# Patient Record
Sex: FEMALE | Race: WHITE | NOT HISPANIC OR LATINO | Employment: FULL TIME | URBAN - METROPOLITAN AREA
[De-identification: names, ages, dates, MRNs, and addresses within clinical notes are randomized per-mention and may not be internally consistent; named-entity substitution may affect disease eponyms.]

---

## 2017-05-12 ENCOUNTER — OFFICE VISIT (OUTPATIENT)
Dept: URGENT CARE | Facility: CLINIC | Age: 47
End: 2017-05-12
Payer: COMMERCIAL

## 2017-05-12 VITALS
BODY MASS INDEX: 22.63 KG/M2 | TEMPERATURE: 98.8 F | DIASTOLIC BLOOD PRESSURE: 62 MMHG | HEART RATE: 75 BPM | OXYGEN SATURATION: 96 % | SYSTOLIC BLOOD PRESSURE: 122 MMHG | WEIGHT: 123 LBS | HEIGHT: 62 IN | RESPIRATION RATE: 12 BRPM

## 2017-05-12 DIAGNOSIS — R42 DIZZINESS: ICD-10-CM

## 2017-05-12 DIAGNOSIS — R55 SYNCOPE, UNSPECIFIED SYNCOPE TYPE: ICD-10-CM

## 2017-05-12 DIAGNOSIS — S09.93XA FACIAL TRAUMA, INITIAL ENCOUNTER: ICD-10-CM

## 2017-05-12 PROCEDURE — 99214 OFFICE O/P EST MOD 30 MIN: CPT | Performed by: NURSE PRACTITIONER

## 2017-05-12 PROCEDURE — 93000 ELECTROCARDIOGRAM COMPLETE: CPT | Performed by: NURSE PRACTITIONER

## 2017-05-12 ASSESSMENT — ENCOUNTER SYMPTOMS
RESPIRATORY NEGATIVE: 1
TINGLING: 0
SEIZURES: 0
SORE THROAT: 1
NECK PAIN: 1
HEADACHES: 1
DIZZINESS: 1
BLURRED VISION: 1
CONSTITUTIONAL NEGATIVE: 1
BACK PAIN: 0
PALPITATIONS: 0
FOCAL WEAKNESS: 0
LOSS OF CONSCIOUSNESS: 1
GASTROINTESTINAL NEGATIVE: 1
SENSORY CHANGE: 0
TREMORS: 0
FALLS: 1

## 2017-05-12 ASSESSMENT — PAIN SCALES - GENERAL: PAINLEVEL: 4=SLIGHT-MODERATE PAIN

## 2017-05-12 NOTE — MR AVS SNAPSHOT
"        Rosita Emmanuel   2017 4:30 PM   Office Visit   MRN: [id number]    Department:  [facility]   Dept Phone:  [phone]    Description:  Female : 1970   Provider:  SANJIV Browning           Reason for Visit     Otalgia last saturday lost concinoness dizzy spells since saturday      Allergies as of 2017     No Known Allergies      You were diagnosed with     Syncope, unspecified syncope type   [0877428]       Facial trauma, initial encounter   [[id number]]       Dizziness   [[id number]]         Vital Signs     Blood Pressure Pulse Temperature Respirations Height Weight    122/62 mmHg 75 37.1 °C (98.8 °F) 12 1.575 m (5' 2.01\") 55.792 kg (123 lb)    Body Mass Index Oxygen Saturation Breastfeeding? Smoking Status          22.49 kg/m2 96% No Former Smoker        Basic Information     Date Of Birth Sex Race Ethnicity Preferred Language    1970 Female White Unknown English      Problem List              ICD-10-CM Priority Class Noted - Resolved    Routine health maintenance Z00.00   2012 - Present    Mild intermittent asthma J45.20   2012 - Present    Allergic rhinitis    2012 - Present    Family history of diabetes mellitus in father Z83.3   2015 - Present    Family history of gestational diabetes mellitus Z83.3   2015 - Present    H/O bilateral breast implants Z98.82   2015 - Present      Health Maintenance        Date Due Completion Dates    IMM PNEUMOCOCCAL 19-64 (ADULT) MEDIUM RISK SERIES (1 of 1 - PPSV23) 1991 ---    MAMMOGRAM 2016, 2014    PAP SMEAR 3/12/2017 3/12/2014 (Done), 2012 (Prv Comp)    Override on 3/12/2014: Done    Override on 2012: Previously completed    IMM DTaP/Tdap/Td Vaccine (2 - Td) 2022            Current Immunizations     Hepatitis B Vaccine Recombivax (Adol/Adult) 2013, 10/30/2013, 2013    Influenza Vaccine Quad Inj (Preserved) 2016    Tdap Vaccine 2012   " Tuberculin Skin Test 8/30/2013, 8/21/2013      Below and/or attached are the medications your provider expects you to take. Review all of your home medications and newly ordered medications with your provider and/or pharmacist. Follow medication instructions as directed by your provider and/or pharmacist. Please keep your medication list with you and share with your provider. Update the information when medications are discontinued, doses are changed, or new medications (including over-the-counter products) are added; and carry medication information at all times in the event of emergency situations     Allergies:  No Known Allergies          Medications  Valid as of: May 12, 2017 -  6:01 PM    Generic Name Brand Name Tablet Size Instructions for use    Albuterol Sulfate (Aero Soln) albuterol 108 (90 BASE) MCG/ACT Inhale 2 Puffs by mouth every 6 hours as needed for Shortness of Breath.        .                 Medicines prescribed today were sent to:     [id number] [place] [id number] [address] [address]    Phone: [phone] Fax: [phone]    Open 24 Hours?: No      Medication refill instructions:       If your prescription bottle indicates you have medication refills left, it is not necessary to call your provider’s office. Please contact your pharmacy and they will refill your medication.    If your prescription bottle indicates you do not have any refills left, you may request refills at any time through one of the following ways: The online [employer] system (except Urgent Care), by calling your provider’s office, or by asking your pharmacy to contact your provider’s office with a refill request. Medication refills are processed only during regular business hours and may not be available until the next business day. Your provider may request additional information or to have a follow-up visit with you prior to refilling your medication.   *Please Note: Medication  refills are assigned a new Rx number when refilled electronically. Your pharmacy may indicate that no refills were authorized even though a new prescription for the same medication is available at the pharmacy. Please request the medicine by name with the pharmacy before contacting your provider for a refill.           [employer] Access Code: Activation code not generated  Current [employer] Status: Active
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
## 2018-03-21 ENCOUNTER — OFFICE VISIT (OUTPATIENT)
Dept: URGENT CARE | Facility: PHYSICIAN GROUP | Age: 48
End: 2018-03-21
Payer: COMMERCIAL

## 2018-03-21 VITALS
SYSTOLIC BLOOD PRESSURE: 112 MMHG | DIASTOLIC BLOOD PRESSURE: 82 MMHG | BODY MASS INDEX: 22.26 KG/M2 | HEART RATE: 73 BPM | TEMPERATURE: 98.6 F | RESPIRATION RATE: 12 BRPM | OXYGEN SATURATION: 96 % | WEIGHT: 121 LBS | HEIGHT: 62 IN

## 2018-03-21 DIAGNOSIS — J01.00 ACUTE NON-RECURRENT MAXILLARY SINUSITIS: ICD-10-CM

## 2018-03-21 DIAGNOSIS — H65.192 ACUTE EFFUSION OF LEFT EAR: ICD-10-CM

## 2018-03-21 PROCEDURE — 99214 OFFICE O/P EST MOD 30 MIN: CPT | Performed by: NURSE PRACTITIONER

## 2018-03-21 RX ORDER — FLUTICASONE PROPIONATE 50 MCG
1 SPRAY, SUSPENSION (ML) NASAL DAILY
Qty: 16 G | Refills: 0 | Status: SHIPPED | OUTPATIENT
Start: 2018-03-21 | End: 2018-10-30

## 2018-03-21 RX ORDER — FLUCONAZOLE 150 MG/1
150 TABLET ORAL DAILY
Qty: 2 TAB | Refills: 0 | Status: SHIPPED | OUTPATIENT
Start: 2018-03-21 | End: 2018-07-16

## 2018-03-21 RX ORDER — AMOXICILLIN AND CLAVULANATE POTASSIUM 875; 125 MG/1; MG/1
1 TABLET, FILM COATED ORAL 2 TIMES DAILY
Qty: 20 TAB | Refills: 0 | Status: SHIPPED | OUTPATIENT
Start: 2018-03-21 | End: 2018-03-31

## 2018-03-21 ASSESSMENT — ENCOUNTER SYMPTOMS
SINUS PRESSURE: 1
SHORTNESS OF BREATH: 0
FEVER: 0
EYE PAIN: 0
SORE THROAT: 0
NAUSEA: 0
SINUS PAIN: 1
HEADACHES: 0
CHILLS: 0
COUGH: 0
VOMITING: 0
MYALGIAS: 0
DIZZINESS: 0

## 2018-03-22 NOTE — PROGRESS NOTES
Subjective:     Rosita Emmanuel is a 47 y.o. female who presents for Otalgia (Lt ear x 4 days ) and Congestion (x 4 days )       Otalgia    There is pain in the left ear. This is a new problem. The current episode started in the past 7 days. The problem occurs constantly. The problem has been unchanged. There has been no fever. The pain is at a severity of 8/10. The pain is moderate. Pertinent negatives include no coughing, ear discharge, headaches, rash, sore throat or vomiting. Associated symptoms comments: Sinus pain  . She has tried nothing for the symptoms. The treatment provided no relief. Her past medical history is significant for a chronic ear infection.   Sinus Problem   This is a new problem. The current episode started in the past 7 days. The problem is unchanged. There has been no fever. Her pain is at a severity of 6/10. The pain is moderate. Associated symptoms include congestion, ear pain and sinus pressure. Pertinent negatives include no chills, coughing, headaches, shortness of breath or sore throat. Past treatments include acetaminophen. The treatment provided no relief.   Patient has a history of yeast infections post anabiotic use. Patient requesting prescription for Diflucan.  Past Medical History:   Diagnosis Date   • Allergic rhinitis 4/11/2012   • Gestational diabetes    • Routine health maintenance 4/11/2012     Past Surgical History:   Procedure Laterality Date   • PB ENLARGE BREAST WITH IMPLANT    2004   • HYSTERECTOMY LAPAROSCOPY     • OTHER      Lasik eye   • PRIMARY C SECTION       Social History     Social History   • Marital status: Single     Spouse name: N/A   • Number of children: N/A   • Years of education: N/A     Occupational History   • Not on file.     Social History Main Topics   • Smoking status: Former Smoker     Packs/day: 0.25     Years: 5.00     Types: Cigarettes     Quit date: 8/26/1992   • Smokeless tobacco: Never Used   • Alcohol use 0.5 oz/week     1 Glasses of  "wine per week      Comment: glass of wine a day   • Drug use: No   • Sexual activity: Yes     Partners: Male      Comment:       Other Topics Concern   • Not on file     Social History Narrative   • No narrative on file      Family History   Problem Relation Age of Onset   • Diabetes Father    • Diabetes Sister    • Cancer Maternal Grandmother      great grand mother in her 90 breast cancer   • GI Neg Hx    • Heart Disease Neg Hx    • Heart Failure Neg Hx    • Hyperlipidemia Neg Hx     Review of Systems   Constitutional: Negative for chills and fever.   HENT: Positive for congestion, ear pain, sinus pain and sinus pressure. Negative for ear discharge and sore throat.    Eyes: Negative for pain.   Respiratory: Negative for cough and shortness of breath.    Cardiovascular: Negative for chest pain.   Gastrointestinal: Negative for nausea and vomiting.   Genitourinary: Negative for hematuria.   Musculoskeletal: Negative for myalgias.   Skin: Negative for rash.   Neurological: Negative for dizziness and headaches.   No Known Allergies   Objective:   /82   Pulse 73   Temp 37 °C (98.6 °F)   Resp 12   Ht 1.575 m (5' 2.01\")   Wt 54.9 kg (121 lb)   SpO2 96%   BMI 22.12 kg/m²   Physical Exam   Constitutional: She is oriented to person, place, and time. She appears well-developed and well-nourished. No distress.   HENT:   Head: Normocephalic and atraumatic.   Right Ear: Tympanic membrane normal. Tympanic membrane is not perforated and not bulging. No middle ear effusion.   Left Ear: Tympanic membrane is bulging. Tympanic membrane is not perforated. A middle ear effusion is present.   Nose: Right sinus exhibits maxillary sinus tenderness and frontal sinus tenderness. Left sinus exhibits maxillary sinus tenderness and frontal sinus tenderness.   Mouth/Throat: Uvula is midline. No posterior oropharyngeal erythema. No tonsillar exudate.   Eyes: Conjunctivae and EOM are normal. Pupils are equal, round, and " reactive to light.   Cardiovascular: Normal rate and regular rhythm.    No murmur heard.  Pulmonary/Chest: Effort normal and breath sounds normal. No respiratory distress.   Abdominal: Soft. She exhibits no distension. There is no tenderness.   Neurological: She is alert and oriented to person, place, and time. She has normal reflexes. No sensory deficit.   Skin: Skin is warm and dry.   Psychiatric: She has a normal mood and affect.   Vitals reviewed.        Assessment/Plan:   Assessment    1. Acute non-recurrent maxillary sinusitis  2. Acute effusion of left ear  - amoxicillin-clavulanate (AUGMENTIN) 875-125 MG Tab; Take 1 Tab by mouth 2 times a day for 10 days.  Dispense: 20 Tab; Refill: 0  - fluticasone (FLONASE) 50 MCG/ACT nasal spray; Spray 1 Spray in nose every day.  Dispense: 16 g; Refill: 0  - fluconazole (DIFLUCAN) 150 MG tablet; Take 1 Tab by mouth every day.  Dispense: 2 Tab; Refill: 0    Advised to continue supportive care with Tylenol and/or ibuprofen for fevers and discomfort. Increased fluids and electrolytes.  Advised Flonase, OTC allergy medication.      Differential diagnosis, natural history, supportive care, and indications for immediate follow-up discussed.

## 2018-07-16 ENCOUNTER — OFFICE VISIT (OUTPATIENT)
Dept: MEDICAL GROUP | Facility: PHYSICIAN GROUP | Age: 48
End: 2018-07-16
Payer: COMMERCIAL

## 2018-07-16 VITALS
HEIGHT: 62 IN | DIASTOLIC BLOOD PRESSURE: 74 MMHG | HEART RATE: 78 BPM | RESPIRATION RATE: 16 BRPM | TEMPERATURE: 99.3 F | OXYGEN SATURATION: 98 % | SYSTOLIC BLOOD PRESSURE: 120 MMHG | WEIGHT: 119 LBS | BODY MASS INDEX: 21.9 KG/M2

## 2018-07-16 DIAGNOSIS — L98.9 SKIN LESION: ICD-10-CM

## 2018-07-16 DIAGNOSIS — J45.20 MILD INTERMITTENT ASTHMA WITHOUT COMPLICATION: ICD-10-CM

## 2018-07-16 DIAGNOSIS — F41.9 ANXIETY: ICD-10-CM

## 2018-07-16 PROCEDURE — 99214 OFFICE O/P EST MOD 30 MIN: CPT | Performed by: INTERNAL MEDICINE

## 2018-07-16 RX ORDER — CITALOPRAM HYDROBROMIDE 10 MG/1
10 TABLET ORAL DAILY
Qty: 30 TAB | Refills: 1 | Status: SHIPPED | OUTPATIENT
Start: 2018-07-16 | End: 2018-09-09 | Stop reason: SDUPTHER

## 2018-07-16 RX ORDER — ALPRAZOLAM 0.5 MG/1
0.5 TABLET ORAL NIGHTLY PRN
COMMUNITY
End: 2018-07-16

## 2018-07-16 RX ORDER — CLONAZEPAM 0.5 MG/1
0.25 TABLET ORAL
Qty: 30 TAB | Refills: 2 | Status: SHIPPED | OUTPATIENT
Start: 2018-07-16 | End: 2018-08-15

## 2018-07-16 ASSESSMENT — PATIENT HEALTH QUESTIONNAIRE - PHQ9: CLINICAL INTERPRETATION OF PHQ2 SCORE: 0

## 2018-07-16 NOTE — ASSESSMENT & PLAN NOTE
Uses flonase as needed. Typically cold or illnesses will set off her asthma. Uses her inhaler very rarely.

## 2018-07-16 NOTE — PROGRESS NOTES
[facility] NEW PATIENT H&P  Chief Complaint   Patient presents with   • Anxiety     History of Present Illness     Mild intermittent asthma  Uses flonase as needed. Typically cold or illnesses will set off her asthma. Uses her inhaler very rarely.     Anxiety  In the last two years she has been going through a nasty divorce and her youngest daughter doesn't talk to her anymore. She has also been going to grad school and has started a new career. She also has issues with anxiety affecting her sleep. It will feel like she has a 50 lb weight on her chest and she has trouble breathing when she's having anxiety. She has been taking alprazolam 0.5 mg quite a bit lately. She doesn't like the side affects of anti-depressants. She is very sensitive to side affects of medications. She has tried Wellbutrin (shaking, difficulty walking, uncontrollable crying for hours), Paxil (weight gain, sexual side affects). Was on Wellbutrin for 3 weeks. Paxil she was on for about 4 months a while ago. She isn't sure if she's tried zoloft before. She has tried therapy before but didn't feel that that therapist was right for her.     Skin lesion  Notes a nodule near her left axilla which she nicks while shaving.     Current Outpatient Prescriptions   Medication Sig Dispense Refill   • citalopram (CELEXA) 10 MG tablet Take 1 Tab by mouth every day. 30 Tab 1   • clonazePAM (KLONOPIN) 0.5 MG Tab Take 0.5 Tabs by mouth 1 time daily as needed for up to 30 days. 30 Tab 2   • fluticasone (FLONASE) 50 MCG/ACT nasal spray Spray 1 Spray in nose every day. 16 g 0     No current facility-administered medications for this visit.      Past Medical History:   Diagnosis Date   • Allergic rhinitis 4/11/2012   • Gestational diabetes    • Mild intermittent asthma 4/11/2012    Secondary to allergies      Past Surgical History:   Procedure Laterality Date   • PB ENLARGE BREAST WITH IMPLANT    2004   • BREAST BIOPSY Right     benign   • HYSTERECTOMY  "LAPAROSCOPY      has ovaries and fallopian tubes. Done for uterine tear    • KNEE ARTHROSCOPY Left     meniscus tear   • MAMMOPLASTY REDUCTION      after initial breast augmentation    • OTHER      Lasik eye   • PRIMARY C SECTION      x2     Social History   Substance Use Topics   • Smoking status: Former Smoker     Packs/day: 0.25     Years: 5.00     Types: Cigarettes     Quit date: 8/26/1992   • Smokeless tobacco: Never Used   • Alcohol use 4.2 oz/week     7 Glasses of wine per week      Comment: glass of wine a day     Social History     Social History Narrative         Family History   Problem Relation Age of Onset   • Diabetes Father    • Diabetes Sister    • Breast Cancer Maternal Grandmother      great grand mother in her 90 breast cancer   • GI Neg Hx    • Heart Disease Neg Hx    • Heart Failure Neg Hx    • Hyperlipidemia Neg Hx      Family Status   Relation Status   • Father Alive   • Mother Alive   • Sister Alive   • Maternal Grandmother    • Neg Hx      Allergies: Patient has no known allergies.    ROS  Constitutional: Negative for fatigue/generalized weakness.   HEENT: Negative for  vision changes, hearing changes    Respiratory: Negative for shortness of breath  Cardiovascular: Negative for chest pain, palpitations  Gastrointestinal: Negative for blood in stool, constipation, diarrhea  Genitourinary: Negative for dysuria, polyuria  Musculoskeletal: Negative for myalgias, back pain, and joint pain.   Skin: Negative for rash  Neurological: Negative for numbness, tingling  Psychiatric/Behavioral: Positive for anxiety as per HPI     Objective   Blood pressure 120/74, pulse 78, temperature 37.4 °C (99.3 °F), resp. rate 16, height 1.575 m (5' 2\"), weight 54 kg (119 lb), SpO2 98 %. Body mass index is 21.77 kg/m².    General: Alert, oriented. In no acute distress   HEET: EOMI, PERRL, conjunctiva non-injected, sclera non-icteric.  Nares patent with no significant congestion or " drainage.  Elvie pinnae, external auditory canals, TM pearly gray with normal light reflex bilaterally.Oral mucous membranes pink and moist with no lesions.  Neck: supple with no cervical, subclavicular lymphadenopathy, JVD, palpable thyroid nodules   Lungs: clear to auscultation bilaterally with good excursion.  CV: regular rate and rhythm.  Abdomen soft, non-distended, non-tender with normal bowel sounds. No hepatosplenomegaly, no masses palpated  Skin: small symmetric flesh colored nodule near left axilla   Psychiatric: appropriate mood and affect     Assessment and Plan   The following treatment plan was discussed     1. Mild intermittent asthma without complication  Quiescent, rarely requires her inhaler.     2. Anxiety  Will trial low dose celexa (warned about worsening depressive/suicidal symptoms when first starting a SSRI) and switch to a longer acting benzodiazepine than alprazolam. Instructed on 3 month follow up for controlled substance refills. Will follow up in 6 weeks to see how this regimen is working for her.   - citalopram (CELEXA) 10 MG tablet; Take 1 Tab by mouth every day.  Dispense: 30 Tab; Refill: 1  - clonazePAM (KLONOPIN) 0.5 MG Tab; Take 0.5 Tabs by mouth 1 time daily as needed for up to 30 days.  Dispense: 30 Tab; Refill: 2    3. Skin lesion  Appears benign but patient would like it removed since she nicks it while shaving.   - REFERRAL TO DERMATOLOGY      Return in about 6 weeks (around 8/27/2018) for anxiety .    Health Maintenance      Health Maintenance Due   Topic Date Due   • IMM PNEUMOCOCCAL 19-64 (ADULT) MEDIUM RISK SERIES (1 of 1 - PPSV23) 05/21/1991   • MAMMOGRAM  09/21/2016   • PAP SMEAR  03/12/2017     Will request operative report for hysterectomy from Dr. Rahman's office     Michael Patel MD  Internal Medicine  [facility]
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
## 2018-07-17 ENCOUNTER — HOSPITAL ENCOUNTER (OUTPATIENT)
Dept: RADIOLOGY | Facility: MEDICAL CENTER | Age: 48
End: 2018-07-17
Attending: INTERNAL MEDICINE
Payer: COMMERCIAL

## 2018-07-17 DIAGNOSIS — Z12.39 SCREENING BREAST EXAMINATION: ICD-10-CM

## 2018-07-17 PROCEDURE — 77067 SCR MAMMO BI INCL CAD: CPT

## 2018-08-28 ENCOUNTER — OFFICE VISIT (OUTPATIENT)
Dept: MEDICAL GROUP | Facility: PHYSICIAN GROUP | Age: 48
End: 2018-08-28
Payer: COMMERCIAL

## 2018-08-28 VITALS
TEMPERATURE: 97.5 F | SYSTOLIC BLOOD PRESSURE: 112 MMHG | OXYGEN SATURATION: 95 % | BODY MASS INDEX: 22.12 KG/M2 | RESPIRATION RATE: 12 BRPM | HEART RATE: 73 BPM | DIASTOLIC BLOOD PRESSURE: 82 MMHG | HEIGHT: 62 IN | WEIGHT: 120.2 LBS

## 2018-08-28 DIAGNOSIS — R23.2 HOT FLASHES: ICD-10-CM

## 2018-08-28 DIAGNOSIS — L30.9 ECZEMA, UNSPECIFIED TYPE: ICD-10-CM

## 2018-08-28 DIAGNOSIS — F41.9 ANXIETY: ICD-10-CM

## 2018-08-28 PROCEDURE — 99214 OFFICE O/P EST MOD 30 MIN: CPT | Performed by: INTERNAL MEDICINE

## 2018-08-28 RX ORDER — DIPHENHYDRAMINE HCL 25 MG
25 TABLET ORAL EVERY 6 HOURS PRN
COMMUNITY

## 2018-08-28 RX ORDER — ESTRADIOL 0.04 MG/D
1 PATCH TRANSDERMAL
Qty: 4 PATCH | Refills: 2 | Status: SHIPPED | OUTPATIENT
Start: 2018-08-28 | End: 2018-10-30 | Stop reason: SDUPTHER

## 2018-08-28 RX ORDER — TRIAMCINOLONE ACETONIDE 5 MG/G
CREAM TOPICAL
Qty: 60 G | Refills: 0 | Status: SHIPPED | OUTPATIENT
Start: 2018-08-28 | End: 2019-02-07

## 2018-08-28 RX ORDER — ESTRADIOL 0.04 MG/D
1 PATCH TRANSDERMAL
Qty: 4 PATCH | Refills: 1 | Status: SHIPPED | OUTPATIENT
Start: 2018-08-28 | End: 2018-08-28 | Stop reason: CLARIF

## 2018-08-28 NOTE — ASSESSMENT & PLAN NOTE
When I last saw Rosita 7/16/2018 she was prescribed celexa 10 mg. On a full tablet of the 10 mg celexa tablet she was feeling shaky but is actually doing really well on just a 1/2 tablet of the 10 mg celexa tablet. She is very happy with this result and hasn't had any weight gain. Is also taking 25 mg diphenhydramine at night to help with sleep. She is sleeping better and is sleeping through the entire night. She had been prescribed clonazepam as well but says that she actually never received or picked up that prescription. Her divorce just finalized and she has also completed her graduate degree.

## 2018-08-28 NOTE — ASSESSMENT & PLAN NOTE
She has been having hot flashes, worse at night, for the last few months. Even when she was camping during the cool night in the camper she was sweating and wanted to run out of the camper. She did have a history of a hysterectomy but does have her ovaries and fallopian tubes remaining.

## 2018-08-28 NOTE — PROGRESS NOTES
PRIMARY CARE CLINIC FOLLOW UP VISIT  Chief Complaint   Patient presents with   • Anxiety     History of Present Illness     Anxiety  When I last saw Rosita 7/16/2018 she was prescribed celexa 10 mg. On a full tablet of the 10 mg celexa tablet she was feeling shaky but is actually doing really well on just a 1/2 tablet of the 10 mg celexa tablet. She is very happy with this result and hasn't had any weight gain. Is also taking 25 mg diphenhydramine at night to help with sleep. She is sleeping better and is sleeping through the entire night. She had been prescribed clonazepam as well but says that she actually never received or picked up that prescription. Her divorce just finalized and she has also completed her graduate degree.      Hot flashes  She has been having hot flashes, worse at night, for the last few months. Even when she was camping during the cool night in the camper she was sweating and wanted to run out of the camper. She did have a history of a hysterectomy but does have her ovaries and fallopian tubes remaining.     Eczema  Has soreness and itching of her scalp for about a month.     Current Outpatient Prescriptions   Medication Sig Dispense Refill   • diphenhydrAMINE (BENADRYL) 25 MG Tab Take 25 mg by mouth every 6 hours as needed for Sleep.     • triamcinolone acetonide (KENALOG) 0.5 % Cream Apply to affected area 60 g 0   • estradiol (CLIMARA) 0.0375 MG/24HR patch Apply 1 Patch to skin as directed every 7 days. 4 Patch 2   • citalopram (CELEXA) 10 MG tablet Take 1 Tab by mouth every day. 30 Tab 1   • fluticasone (FLONASE) 50 MCG/ACT nasal spray Spray 1 Spray in nose every day. 16 g 0     No current facility-administered medications for this visit.      Past Medical History:   Diagnosis Date   • Allergic rhinitis 4/11/2012   • Gestational diabetes    • Mild intermittent asthma 4/11/2012    Secondary to allergies      Past Surgical History:   Procedure Laterality Date   • PB ENLARGE BREAST WITH  "IMPLANT    2004   • BREAST BIOPSY Right     benign   • HYSTERECTOMY LAPAROSCOPY      has ovaries and fallopian tubes. Done for uterine tear    • KNEE ARTHROSCOPY Left     meniscus tear   • MAMMOPLASTY REDUCTION      after initial breast augmentation    • OTHER      Lasik eye   • PRIMARY C SECTION      x2     Social History   Substance Use Topics   • Smoking status: Former Smoker     Packs/day: 0.25     Years: 5.00     Types: Cigarettes     Quit date: 8/26/1992   • Smokeless tobacco: Never Used   • Alcohol use 4.2 oz/week     7 Glasses of wine per week      Comment: glass of wine a day     Social History     Social History Narrative         Family History   Problem Relation Age of Onset   • Diabetes Father    • Diabetes Sister    • Breast Cancer Maternal Grandmother         great grand mother in her 90 breast cancer   • GI Neg Hx    • Heart Disease Neg Hx    • Heart Failure Neg Hx    • Hyperlipidemia Neg Hx      Family Status   Relation Status   • Fa Alive   • Mo Alive   • Sis Alive   • MGMo (Not Specified)   • Neg Hx (Not Specified)     Allergies: Patient has no known allergies.    ROS  As per HPI above. All other systems reviewed and negative.        Objective   Blood pressure 112/82, pulse 73, temperature 36.4 °C (97.5 °F), resp. rate 12, height 1.575 m (5' 2\"), weight 54.5 kg (120 lb 3.2 oz), SpO2 95 %. Body mass index is 21.98 kg/m².    General: alert and oriented, pleasant, cooperative  HEENT: Normocephalic, atraumatic. No thyroid masses. Oropharynx clear without exudate or injection.   Cardiovascular: regular rate and rhythm, normal S1/S2  Pulmonary: lungs clear to auscultation bilaterally  Gastrointestinal: no tenderness to palpation. No hepatosplenomegaly. Bowel sounds normoactive  Lymphatics: no cervical or supraclavicular lymphadenopathy   Skin: eczematous patch at hairline near front right edge of her scalp   Psychiatric: appropriate mood and affect. Good insight and appropriate " judgment     Assessment and Plan   The following treatment plan was discussed     1. Anxiety  Doing very well on just a 1/2 tablet of citalopram 10 mg.     2. Hot flashes  Will trial a low dose estradiol patch.   - estradiol (CLIMARA) 0.0375 MG/24HR patch; Apply 1 Patch to skin as directed every 7 days.  Dispense: 4 Patch; Refill: 2    3. Eczema, unspecified type  - triamcinolone acetonide (KENALOG) 0.5 % Cream; Apply to affected area  Dispense: 60 g; Refill: 0    Healthcare maintenance     Health Maintenance Due   Topic Date Due   • IMM PNEUMOCOCCAL 19-64 (ADULT) MEDIUM RISK SERIES (1 of 1 - PPSV23) 05/21/1991   • PAP SMEAR  03/12/2017   • IMM INFLUENZA (1) 09/01/2018       Return in about 3 months (around 11/28/2018).    Michael Patel MD  Internal Medicine  [facility]

## 2018-09-09 DIAGNOSIS — F41.9 ANXIETY: ICD-10-CM

## 2018-09-11 RX ORDER — CITALOPRAM HYDROBROMIDE 10 MG/1
TABLET ORAL
Qty: 90 TAB | Refills: 0 | Status: SHIPPED | OUTPATIENT
Start: 2018-09-11 | End: 2018-10-30 | Stop reason: SDUPTHER

## 2018-09-11 NOTE — TELEPHONE ENCOUNTER
Was the patient seen in the last year in this department? Yes    Does patient have an active prescription for medications requested? No     Received Request Via: Pharmacy    Pt met protocol?: Yes     Last OV 08/2018

## 2018-09-21 ENCOUNTER — OFFICE VISIT (OUTPATIENT)
Dept: URGENT CARE | Facility: PHYSICIAN GROUP | Age: 48
End: 2018-09-21
Payer: COMMERCIAL

## 2018-09-21 VITALS
WEIGHT: 122 LBS | DIASTOLIC BLOOD PRESSURE: 78 MMHG | BODY MASS INDEX: 22.31 KG/M2 | SYSTOLIC BLOOD PRESSURE: 120 MMHG | HEART RATE: 64 BPM | OXYGEN SATURATION: 99 % | TEMPERATURE: 97.8 F

## 2018-09-21 DIAGNOSIS — H66.002 ACUTE SUPPURATIVE OTITIS MEDIA OF LEFT EAR WITHOUT SPONTANEOUS RUPTURE OF TYMPANIC MEMBRANE, RECURRENCE NOT SPECIFIED: ICD-10-CM

## 2018-09-21 PROCEDURE — 99213 OFFICE O/P EST LOW 20 MIN: CPT | Performed by: FAMILY MEDICINE

## 2018-09-21 RX ORDER — FLUCONAZOLE 100 MG/1
100 TABLET ORAL DAILY
Qty: 2 TAB | Refills: 0 | Status: SHIPPED | OUTPATIENT
Start: 2018-09-21 | End: 2018-10-30

## 2018-09-21 RX ORDER — AMOXICILLIN 500 MG/1
500 CAPSULE ORAL 3 TIMES DAILY
Qty: 30 CAP | Refills: 0 | Status: SHIPPED | OUTPATIENT
Start: 2018-09-21 | End: 2018-10-30

## 2018-09-21 ASSESSMENT — ENCOUNTER SYMPTOMS
NAUSEA: 0
COUGH: 0
SHORTNESS OF BREATH: 0
HEADACHES: 0
VOMITING: 0
SORE THROAT: 1
DIARRHEA: 0
FEVER: 0
ABDOMINAL PAIN: 0
CHILLS: 0

## 2018-09-21 NOTE — PROGRESS NOTES
Subjective:     Rosita Emmanuel is a 48 y.o. female who presents for Pharyngitis (sore throat, face pain, sinus issues, left ear pain )       HPI   Pt here for evaluation of left ear pain and left sided throat pain   No discharge, bleeding, tinnitus, or muffled hearing   Ear pain has been going on for 4 days and is worsening  Ear pain is constant and sharp  Nothing makes ear pain better or worse  Bothers her the most in the mornings  Does not have history of recurrent ear infections  Left sided throat pain is mild    Review of Systems   Constitutional: Negative for chills and fever.   HENT: Positive for ear pain and sore throat. Negative for congestion, ear discharge, hearing loss and tinnitus.    Respiratory: Negative for cough and shortness of breath.    Cardiovascular: Negative for chest pain.   Gastrointestinal: Negative for abdominal pain, diarrhea, nausea and vomiting.   Skin: Negative for rash.   Neurological: Negative for headaches.   All other systems reviewed and are negative.     PMH:  has a past medical history of Allergic rhinitis (4/11/2012); Gestational diabetes; and Mild intermittent asthma (4/11/2012).  MEDS:   Current Outpatient Prescriptions:   •  citalopram (CELEXA) 10 MG tablet, TAKE 1 TABLET BY MOUTH EVERY DAY, Disp: 90 Tab, Rfl: 0  •  estradiol (CLIMARA) 0.0375 MG/24HR patch, Apply 1 Patch to skin as directed every 7 days., Disp: 4 Patch, Rfl: 2  •  diphenhydrAMINE (BENADRYL) 25 MG Tab, Take 25 mg by mouth every 6 hours as needed for Sleep., Disp: , Rfl:   •  triamcinolone acetonide (KENALOG) 0.5 % Cream, Apply to affected area, Disp: 60 g, Rfl: 0  •  fluticasone (FLONASE) 50 MCG/ACT nasal spray, Spray 1 Spray in nose every day., Disp: 16 g, Rfl: 0  ALLERGIES: No Known Allergies  SURGHX:   Past Surgical History:   Procedure Laterality Date   • PB ENLARGE BREAST WITH IMPLANT    2004   • BREAST BIOPSY Right     benign   • HYSTERECTOMY LAPAROSCOPY      has ovaries and fallopian tubes. Done for  uterine tear    • KNEE ARTHROSCOPY Left     meniscus tear   • MAMMOPLASTY REDUCTION      after initial breast augmentation    • OTHER      Lasik eye   • PRIMARY C SECTION      x2     SOCHX:  reports that she quit smoking about 26 years ago. Her smoking use included Cigarettes. She has a 1.25 pack-year smoking history. She has never used smokeless tobacco. She reports that she drinks about 4.2 oz of alcohol per week . She reports that she does not use drugs.  FH: Family history was reviewed, no pertinent findings to report     Objective:   /78   Pulse 64   Temp 36.6 °C (97.8 °F)   Wt 55.3 kg (122 lb)   SpO2 99%   BMI 22.31 kg/m²   Physical Exam   Constitutional: She appears well-developed and well-nourished. No distress.   HENT:   Head: Normocephalic and atraumatic.   Right Ear: Tympanic membrane, external ear and ear canal normal.   Left Ear: External ear and ear canal normal.   Nose: Nose normal.   Left tympanic membrane with small purulent effusion, no erythema   Eyes: Conjunctivae and EOM are normal. Right eye exhibits no discharge. Left eye exhibits no discharge. No scleral icterus.   Neck: Normal range of motion. No tracheal deviation present.   Cardiovascular: Normal rate, regular rhythm and normal heart sounds.    Pulmonary/Chest: Effort normal and breath sounds normal. No respiratory distress. She has no wheezes. She has no rales.   Neurological: She is alert. Coordination normal.   Skin: Skin is warm and dry. No rash noted. She is not diaphoretic.   Psychiatric: She has a normal mood and affect. Her behavior is normal. Judgment and thought content normal.        Assessment/Plan:   Assessment    1. Acute suppurative otitis media of left ear without spontaneous rupture of tympanic membrane, recurrence not specified  Patient with painful left-sided otitis media.  Will treat with amoxicillin.  Patient has history of frequent yeast infections with antibiotics and will give Diflucan.  Follow-up as  needed  - fluconazole (DIFLUCAN) 100 MG Tab; Take 1 Tab by mouth every day.  Dispense: 2 Tab; Refill: 0  - amoxicillin (AMOXIL) 500 MG Cap; Take 1 Cap by mouth 3 times a day.  Dispense: 30 Cap; Refill: 0  Case and results reviewed and agree with treatment plan as outlined.  Dr. Stuart

## 2018-10-30 ENCOUNTER — OFFICE VISIT (OUTPATIENT)
Dept: MEDICAL GROUP | Facility: PHYSICIAN GROUP | Age: 48
End: 2018-10-30
Payer: COMMERCIAL

## 2018-10-30 VITALS
HEART RATE: 70 BPM | DIASTOLIC BLOOD PRESSURE: 80 MMHG | RESPIRATION RATE: 14 BRPM | WEIGHT: 123 LBS | TEMPERATURE: 98.4 F | HEIGHT: 62 IN | BODY MASS INDEX: 22.63 KG/M2 | OXYGEN SATURATION: 96 % | SYSTOLIC BLOOD PRESSURE: 108 MMHG

## 2018-10-30 DIAGNOSIS — R23.2 HOT FLASHES: ICD-10-CM

## 2018-10-30 DIAGNOSIS — F41.9 ANXIETY: ICD-10-CM

## 2018-10-30 DIAGNOSIS — Z00.00 WELLNESS EXAMINATION: ICD-10-CM

## 2018-10-30 DIAGNOSIS — Z23 NEED FOR VACCINATION: ICD-10-CM

## 2018-10-30 PROCEDURE — 90686 IIV4 VACC NO PRSV 0.5 ML IM: CPT | Performed by: INTERNAL MEDICINE

## 2018-10-30 PROCEDURE — 99396 PREV VISIT EST AGE 40-64: CPT | Mod: 25 | Performed by: INTERNAL MEDICINE

## 2018-10-30 PROCEDURE — 90471 IMMUNIZATION ADMIN: CPT | Performed by: INTERNAL MEDICINE

## 2018-10-30 RX ORDER — ESTRADIOL 0.04 MG/D
1 PATCH TRANSDERMAL
Qty: 12 PATCH | Refills: 1 | Status: SHIPPED | OUTPATIENT
Start: 2018-10-30 | End: 2019-02-07 | Stop reason: SDUPTHER

## 2018-10-30 RX ORDER — CITALOPRAM HYDROBROMIDE 10 MG/1
10 TABLET ORAL
Qty: 90 TAB | Refills: 0 | Status: SHIPPED | OUTPATIENT
Start: 2018-10-30 | End: 2019-03-02 | Stop reason: SDUPTHER

## 2018-10-30 NOTE — ASSESSMENT & PLAN NOTE
Rosita is doing well. Her anxiety is well controlled on 1/2 tab of celexa 10 mg although sometimes she takes an entire tablet. Her hot flashes, although she is still having them, are better controlled on the climara patch.

## 2018-10-30 NOTE — PROGRESS NOTES
PRIMARY CARE CLINIC ANNUAL EXAM  Chief Complaint   Patient presents with   • Anxiety     citalopram (CELEXA)    • Other     Hot flashes; estradiol (CLIMARA)        History of Present Illness     Wellness examination  Rosita is doing well. Her anxiety is well controlled on 1/2 tab of celexa 10 mg although sometimes she takes an entire tablet. Her hot flashes, although she is still having them, are better controlled on the climara patch.       Current Outpatient Prescriptions   Medication Sig Dispense Refill   • citalopram (CELEXA) 10 MG tablet Take 1 Tab by mouth every day. 90 Tab 0   • estradiol (CLIMARA) 0.0375 MG/24HR patch Apply 1 Patch to skin as directed every 7 days. 12 Patch 1   • diphenhydrAMINE (BENADRYL) 25 MG Tab Take 25 mg by mouth every 6 hours as needed for Sleep.     • triamcinolone acetonide (KENALOG) 0.5 % Cream Apply to affected area 60 g 0     No current facility-administered medications for this visit.        Past Medical History:   Diagnosis Date   • Allergic rhinitis 4/11/2012   • Gestational diabetes    • Mild intermittent asthma 4/11/2012    Secondary to allergies      Past Surgical History:   Procedure Laterality Date   • PB ENLARGE BREAST WITH IMPLANT    2004   • BREAST BIOPSY Right     benign   • HYSTERECTOMY LAPAROSCOPY      has ovaries and fallopian tubes. Done for uterine tear    • KNEE ARTHROSCOPY Left     meniscus tear   • MAMMOPLASTY REDUCTION      after initial breast augmentation    • OTHER      Lasik eye   • PRIMARY C SECTION      x2     Social History   Substance Use Topics   • Smoking status: Former Smoker     Packs/day: 0.25     Years: 5.00     Types: Cigarettes     Quit date: 8/26/1992   • Smokeless tobacco: Never Used   • Alcohol use 4.2 oz/week     7 Glasses of wine per week      Comment: glass of wine a day     Social History     Social History Narrative         Family History   Problem Relation Age of Onset   • Diabetes Father    • Diabetes Sister    •  "Breast Cancer Maternal Grandmother         great grand mother in her 90 breast cancer   • GI Neg Hx    • Heart Disease Neg Hx    • Heart Failure Neg Hx    • Hyperlipidemia Neg Hx      Family Status   Relation Status   • Fa Alive   • Mo Alive   • Sis Alive   • MGMo (Not Specified)   • Neg Hx (Not Specified)     Allergies: Patient has no known allergies.    ROS  Constitutional: Negative for fatigue/generalized weakness.   HEENT: Negative for  vision changes, hearing changes    Respiratory: Negative for shortness of breath  Cardiovascular: Negative for chest pain, palpitations  Gastrointestinal: Negative for blood in stool, constipation, diarrhea  Genitourinary: Negative for dysuria, polyuria  Musculoskeletal: Negative for myalgias, back pain, and joint pain.   Skin: Negative for rash  Neurological: Negative for numbness, tingling  Psychiatric/Behavioral: Negative for depression, anxiety       Objective   Blood pressure 108/80, pulse 70, temperature 36.9 °C (98.4 °F), temperature source Temporal, resp. rate 14, height 1.575 m (5' 2\"), weight 55.8 kg (123 lb), SpO2 96 %, not currently breastfeeding. Body mass index is 22.5 kg/m².    General: Alert, oriented. In no acute distress   HEET: EOMI, PERRL, conjunctiva non-injected, sclera non-icteric.  Nares patent with no significant congestion or drainage.  Elvie pinnae, external auditory canals, TM pearly gray with normal light reflex bilaterally.Oral mucous membranes pink and moist with no lesions.  Neck: supple with no cervical, subclavicular lymphadenopathy, JVD, palpable thyroid nodules   Lungs: clear to auscultation bilaterally with good excursion.  CV: regular rate and rhythm.  Abdomen soft, non-distended, non-tender with normal bowel sounds. No hepatosplenomegaly, no masses palpated  Skin: no lesions. Warm, dry   Psychiatric: appropriate mood and affect       Assessment and Plan   The following treatment plan was discussed     1. Anxiety  - citalopram (CELEXA) 10 MG " tablet; Take 1 Tab by mouth every day.  Dispense: 90 Tab; Refill: 0    2. Hot flashes  - estradiol (CLIMARA) 0.0375 MG/24HR patch; Apply 1 Patch to skin as directed every 7 days.  Dispense: 12 Patch; Refill: 1    3. Need for vaccination  - Flu Quad Inj >3 Year Pre-Filled PF    4. Wellness examination  - COMP METABOLIC PANEL; Future  - CBC WITH DIFFERENTIAL; Future  - LIPID PROFILE; Future  - VITAMIN D,25 HYDROXY; Future  - TSH WITH REFLEX TO FT4; Future      Return in about 1 year (around 10/30/2019).    Health Maintenance      Health Maintenance Due   Topic Date Due   • IMM PNEUMOCOCCAL 19-64 (ADULT) MEDIUM RISK SERIES (1 of 1 - PPSV23) 05/21/1991   • IMM INFLUENZA (1) 09/01/2018       Michael Patel MD  Internal Medicine  [facility]

## 2018-12-05 ENCOUNTER — TELEPHONE (OUTPATIENT)
Dept: SCHEDULING | Facility: IMAGING CENTER | Age: 48
End: 2018-12-05

## 2018-12-05 ENCOUNTER — TELEPHONE (OUTPATIENT)
Dept: MEDICAL GROUP | Facility: PHYSICIAN GROUP | Age: 48
End: 2018-12-05

## 2018-12-05 ENCOUNTER — OFFICE VISIT (OUTPATIENT)
Dept: URGENT CARE | Facility: PHYSICIAN GROUP | Age: 48
End: 2018-12-05
Payer: COMMERCIAL

## 2018-12-05 VITALS
TEMPERATURE: 97 F | HEART RATE: 60 BPM | BODY MASS INDEX: 24.33 KG/M2 | DIASTOLIC BLOOD PRESSURE: 72 MMHG | WEIGHT: 133 LBS | OXYGEN SATURATION: 97 % | RESPIRATION RATE: 18 BRPM | SYSTOLIC BLOOD PRESSURE: 118 MMHG

## 2018-12-05 DIAGNOSIS — J45.901 MILD ASTHMA WITH ACUTE EXACERBATION, UNSPECIFIED WHETHER PERSISTENT: ICD-10-CM

## 2018-12-05 DIAGNOSIS — J06.9 UPPER RESPIRATORY TRACT INFECTION, UNSPECIFIED TYPE: ICD-10-CM

## 2018-12-05 PROCEDURE — 99214 OFFICE O/P EST MOD 30 MIN: CPT | Performed by: PHYSICIAN ASSISTANT

## 2018-12-05 RX ORDER — ALBUTEROL SULFATE 90 UG/1
2 AEROSOL, METERED RESPIRATORY (INHALATION) EVERY 6 HOURS PRN
Qty: 8.5 G | Refills: 0 | Status: SHIPPED | OUTPATIENT
Start: 2018-12-05

## 2018-12-05 RX ORDER — METHYLPREDNISOLONE 4 MG/1
TABLET ORAL
Qty: 1 KIT | Refills: 0 | Status: SHIPPED | OUTPATIENT
Start: 2018-12-05 | End: 2019-02-07

## 2018-12-05 NOTE — LETTER
December 5, 2018         Patient: Rosita Emmanuel   YOB: 1970   Date of Visit: 12/5/2018           To Whom it May Concern:    Rosita Emmanuel was seen in my clinic on 12/5/2018. Please excuse this patient from work due to recent illness- she will be out the rest of the week.     If you have any questions or concerns, please don't hesitate to call.        Sincerely,           Domenic Brunson P.A.-C.  Electronically Signed

## 2018-12-06 NOTE — TELEPHONE ENCOUNTER
1. Caller Name: Rosita                                          Call Back Number: [phone] (home)        Patient approves a detailed voicemail message: N\A    2. What are the patient's symptoms (location & severity)? Coughing (w/ deep breaths), congestion, ear pressure     3. Is this a new symptom Yes    4. When did it start? 12/03/18    5. Action taken per Active Symptom Guide: Urgent Care recommended    6. Patient agrees to recommended action per Active Symptom Escalation Protocol.

## 2018-12-07 ASSESSMENT — ENCOUNTER SYMPTOMS
FEVER: 0
MYALGIAS: 0
SORE THROAT: 1
RHINORRHEA: 1
VOMITING: 0
SHORTNESS OF BREATH: 0
NECK PAIN: 0
WHEEZING: 1
EYE REDNESS: 0
ABDOMINAL PAIN: 0
DIARRHEA: 0
CHILLS: 0
SPUTUM PRODUCTION: 1
DIZZINESS: 0
HEADACHES: 0
SINUS PAIN: 1
COUGH: 1
EYE DISCHARGE: 0
TINGLING: 0

## 2018-12-07 NOTE — PROGRESS NOTES
Subjective:      Rosita Emmanuel is a 48 y.o. female who presents with Sinus Problem (x2 days, cough, ear pain, hurts to take a deep breath )            URI    This is a new problem. Episode onset: 2-3 days ago. The problem has been waxing and waning. There has been no fever. Associated symptoms include congestion, coughing, a plugged ear sensation, rhinorrhea, sinus pain, a sore throat and wheezing. Pertinent negatives include no abdominal pain, chest pain, diarrhea, dysuria, ear pain, headaches, neck pain, rash or vomiting. Treatments tried: albuterol, OTC cold meds. The treatment provided mild relief.   Pt reports hx of asthma along with Bronchitis.     Review of Systems   Constitutional: Positive for malaise/fatigue. Negative for chills and fever.   HENT: Positive for congestion, rhinorrhea, sinus pain and sore throat. Negative for ear discharge and ear pain.    Eyes: Negative for discharge and redness.   Respiratory: Positive for cough, sputum production and wheezing. Negative for shortness of breath.    Cardiovascular: Negative for chest pain.   Gastrointestinal: Negative for abdominal pain, diarrhea and vomiting.   Genitourinary: Negative for dysuria and urgency.   Musculoskeletal: Negative for myalgias and neck pain.   Skin: Negative for itching and rash.   Neurological: Negative for dizziness, tingling and headaches.   All other systems reviewed and are negative.         Objective:     /72   Pulse 60   Temp 36.1 °C (97 °F)   Resp 18   Wt 60.3 kg (133 lb)   SpO2 97%   BMI 24.33 kg/m²    PMH:  has a past medical history of Allergic rhinitis (4/11/2012); Gestational diabetes; and Mild intermittent asthma (4/11/2012).  MEDS:   Current Outpatient Prescriptions:   •  albuterol 108 (90 Base) MCG/ACT Aero Soln inhalation aerosol, Inhale 2 Puffs by mouth every 6 hours as needed for Shortness of Breath., Disp: 8.5 g, Rfl: 0  •  MethylPREDNISolone (MEDROL DOSEPAK) 4 MG Tablet Therapy Pack, UAD, Disp: 1  Kit, Rfl: 0  •  citalopram (CELEXA) 10 MG tablet, Take 1 Tab by mouth every day., Disp: 90 Tab, Rfl: 0  •  estradiol (CLIMARA) 0.0375 MG/24HR patch, Apply 1 Patch to skin as directed every 7 days., Disp: 12 Patch, Rfl: 1  •  diphenhydrAMINE (BENADRYL) 25 MG Tab, Take 25 mg by mouth every 6 hours as needed for Sleep., Disp: , Rfl:   •  triamcinolone acetonide (KENALOG) 0.5 % Cream, Apply to affected area, Disp: 60 g, Rfl: 0  ALLERGIES: No Known Allergies  SURGHX:   Past Surgical History:   Procedure Laterality Date   • PB ENLARGE BREAST WITH IMPLANT    2004   • BREAST BIOPSY Right     benign   • HYSTERECTOMY LAPAROSCOPY      has ovaries and fallopian tubes. Done for uterine tear    • KNEE ARTHROSCOPY Left     meniscus tear   • MAMMOPLASTY REDUCTION      after initial breast augmentation    • OTHER      Lasik eye   • PRIMARY C SECTION      x2     SOCHX:  reports that she quit smoking about 26 years ago. Her smoking use included Cigarettes. She has a 1.25 pack-year smoking history. She has never used smokeless tobacco. She reports that she drinks about 4.2 oz of alcohol per week . She reports that she does not use drugs.  FH: Family history was reviewed, no pertinent findings to report    Physical Exam   Constitutional: She is oriented to person, place, and time. She appears well-developed and well-nourished.   HENT:   Head: Normocephalic and atraumatic.   Mouth/Throat: No oropharyngeal exudate.   Ears- Canals clear- TM- with clear fluid effusions bilaterally.   Pos. PND, with slight erythema- without tonsillar edema or exudate.   Mild discharge noted bilaterally- to nares.      Eyes: Pupils are equal, round, and reactive to light. EOM are normal.   Neck: Normal range of motion. Neck supple.   Cardiovascular: Normal rate and regular rhythm.    No murmur heard.  Pulmonary/Chest: Effort normal. No respiratory distress. She has wheezes.   Slight upper lung exp. Wheezing.    Musculoskeletal: Normal range of motion. She  exhibits no tenderness.   Lymphadenopathy:     She has no cervical adenopathy.   Neurological: She is alert and oriented to person, place, and time.   Skin: Skin is warm. No rash noted.   Psychiatric: She has a normal mood and affect. Her behavior is normal.   Vitals reviewed.              Assessment/Plan:     1. Mild asthma with acute exacerbation, unspecified whether persistent    2. Upper respiratory tract infection, unspecified type  - albuterol 108 (90 Base) MCG/ACT Aero Soln inhalation aerosol; Inhale 2 Puffs by mouth every 6 hours as needed for Shortness of Breath.  Dispense: 8.5 g; Refill: 0  - MethylPREDNISolone (MEDROL DOSEPAK) 4 MG Tablet Therapy Pack; UAD  Dispense: 1 Kit; Refill: 0    It was explained to the pt. Today that due to the viral nature of the pt's illness, we will treat symptomatically today. Encouraged OTC supportive meds PRN. Humidification, increase fluids, avoid night time dairy.   Patient given precautionary s/sx that mandate immediate follow up and evaluation in the ED. Advised of risks of not doing so.    DDX, Supportive care, and indications for immediate follow-up discussed with patient.    Instructed to return to clinic or nearest emergency department if we are not available for any change in condition, further concerns, or worsening of symptoms.    The patient demonstrated a good understanding and agreed with the treatment plan.

## 2018-12-13 ENCOUNTER — PATIENT MESSAGE (OUTPATIENT)
Dept: MEDICAL GROUP | Facility: PHYSICIAN GROUP | Age: 48
End: 2018-12-13

## 2018-12-13 RX ORDER — BENZONATATE 100 MG/1
100 CAPSULE ORAL 3 TIMES DAILY PRN
Qty: 60 CAP | Refills: 0 | Status: SHIPPED | OUTPATIENT
Start: 2018-12-13 | End: 2019-02-07

## 2019-02-07 ENCOUNTER — OFFICE VISIT (OUTPATIENT)
Dept: MEDICAL GROUP | Facility: PHYSICIAN GROUP | Age: 49
End: 2019-02-07
Payer: COMMERCIAL

## 2019-02-07 VITALS
DIASTOLIC BLOOD PRESSURE: 86 MMHG | SYSTOLIC BLOOD PRESSURE: 108 MMHG | TEMPERATURE: 97.6 F | WEIGHT: 121 LBS | BODY MASS INDEX: 22.26 KG/M2 | HEART RATE: 76 BPM | HEIGHT: 62 IN | OXYGEN SATURATION: 96 % | RESPIRATION RATE: 16 BRPM

## 2019-02-07 DIAGNOSIS — R23.2 HOT FLASHES: ICD-10-CM

## 2019-02-07 DIAGNOSIS — L29.9 ITCHY SCALP: ICD-10-CM

## 2019-02-07 PROCEDURE — 99214 OFFICE O/P EST MOD 30 MIN: CPT | Performed by: INTERNAL MEDICINE

## 2019-02-07 RX ORDER — ESTRADIOL 0.04 MG/D
1 PATCH TRANSDERMAL
Qty: 12 PATCH | Refills: 1 | Status: SHIPPED | OUTPATIENT
Start: 2019-02-07 | End: 2019-05-08

## 2019-02-07 RX ORDER — BETAMETHASONE VALERATE 1.2 MG/G
1 AEROSOL, FOAM TOPICAL DAILY
Qty: 1 CAN | Refills: 1 | Status: SHIPPED | OUTPATIENT
Start: 2019-02-07

## 2019-02-07 ASSESSMENT — PATIENT HEALTH QUESTIONNAIRE - PHQ9: CLINICAL INTERPRETATION OF PHQ2 SCORE: 0

## 2019-02-08 NOTE — ASSESSMENT & PLAN NOTE
Started having an itchy scalp 11/2018. Scratching her scalp hurts. Has white patches and flakes. Doesn't note any bleeding. She's tried kenalog cream but it didn't help. Had some patches on the top of her scalp before but it's resolved before. Her sister has psoriasis.

## 2019-02-08 NOTE — PROGRESS NOTES
PRIMARY CARE CLINIC FOLLOW UP VISIT  Chief Complaint   Patient presents with   • Eczema     On scalp      History of Present Illness     Itchy scalp  Started having an itchy scalp 11/2018. Scratching her scalp hurts. Has white patches and flakes. Doesn't note any bleeding. She's tried kenalog cream but it didn't help. Had some patches on the top of her scalp before but it's resolved before. Her sister has psoriasis.     Current Outpatient Prescriptions   Medication Sig Dispense Refill   • Betamethasone Valerate 0.12 % Foam 1 Application by Apply externally route every day. 1 Can 1   • estradiol (CLIMARA) 0.0375 MG/24HR patch Apply 1 Patch to skin as directed every 7 days. 12 Patch 1   • albuterol 108 (90 Base) MCG/ACT Aero Soln inhalation aerosol Inhale 2 Puffs by mouth every 6 hours as needed for Shortness of Breath. 8.5 g 0   • citalopram (CELEXA) 10 MG tablet Take 1 Tab by mouth every day. 90 Tab 0   • diphenhydrAMINE (BENADRYL) 25 MG Tab Take 25 mg by mouth every 6 hours as needed for Sleep.       No current facility-administered medications for this visit.      Past Medical History:   Diagnosis Date   • Allergic rhinitis 4/11/2012   • Gestational diabetes    • Mild intermittent asthma 4/11/2012    Secondary to allergies      Past Surgical History:   Procedure Laterality Date   • PB ENLARGE BREAST WITH IMPLANT    2004   • BREAST BIOPSY Right     benign   • HYSTERECTOMY LAPAROSCOPY      has ovaries and fallopian tubes. Done for uterine tear    • KNEE ARTHROSCOPY Left     meniscus tear   • MAMMOPLASTY REDUCTION      after initial breast augmentation    • OTHER      Lasik eye   • PRIMARY C SECTION      x2     Social History   Substance Use Topics   • Smoking status: Former Smoker     Packs/day: 0.25     Years: 5.00     Types: Cigarettes     Quit date: 8/26/1992   • Smokeless tobacco: Never Used   • Alcohol use 4.2 oz/week     7 Glasses of wine per week      Comment: glass of wine a day     Social History     Social  "History Narrative         Family History   Problem Relation Age of Onset   • Diabetes Father    • Diabetes Sister    • Breast Cancer Maternal Grandmother         great grand mother in her 90 breast cancer   • GI Neg Hx    • Heart Disease Neg Hx    • Heart Failure Neg Hx    • Hyperlipidemia Neg Hx      Family Status   Relation Status   • Fa Alive   • Mo Alive   • Sis Alive   • MGMo (Not Specified)   • Neg Hx (Not Specified)     Allergies: Patient has no known allergies.    ROS  As per HPI above. All other systems reviewed and negative.        Objective   Blood pressure 108/86, pulse 76, temperature 36.4 °C (97.6 °F), resp. rate 16, height 1.575 m (5' 2\"), weight 54.9 kg (121 lb), SpO2 96 %, not currently breastfeeding. Body mass index is 22.13 kg/m².    General: alert and oriented, pleasant, cooperative  HEENT: Normocephalic, atraumatic.  Skin: scaly, white patch of right upper scalp surrounding by red base   Psychiatric: appropriate mood and affect. Good insight and appropriate judgment     Assessment and Plan   The following treatment plan was discussed     1. Itchy scalp  Her scalp does seem consistent with a patch of psoriasis. Trial of a higher potency corticosteroid as foam for easier application to her scalp.   - Betamethasone Valerate 0.12 % Foam; 1 Application by Apply externally route every day.  Dispense: 1 Can; Refill: 1    2. Hot flashes  - estradiol (CLIMARA) 0.0375 MG/24HR patch; Apply 1 Patch to skin as directed every 7 days.  Dispense: 12 Patch; Refill: 1      Healthcare maintenance     Health Maintenance Due   Topic Date Due   • IMM PNEUMOCOCCAL 19-64 (ADULT) MEDIUM RISK SERIES (1 of 1 - PPSV23) 05/21/1991       Return if symptoms worsen or fail to improve.    Michael Patel MD  Internal Medicine  [facility]                   "

## 2019-03-02 DIAGNOSIS — F41.9 ANXIETY: ICD-10-CM

## 2019-03-04 RX ORDER — CITALOPRAM HYDROBROMIDE 10 MG/1
TABLET ORAL
Qty: 90 TAB | Refills: 1 | Status: SHIPPED | OUTPATIENT
Start: 2019-03-04

## 2019-03-04 NOTE — TELEPHONE ENCOUNTER
Was the patient seen in the last year in this department? Yes    Does patient have an active prescription for medications requested? No     Received Request Via: Pharmacy      Pt met protocol?: Yes   Pt last ov 2/19

## 2019-04-07 ENCOUNTER — PATIENT MESSAGE (OUTPATIENT)
Dept: MEDICAL GROUP | Facility: PHYSICIAN GROUP | Age: 49
End: 2019-04-07

## 2019-04-07 DIAGNOSIS — L40.9 SCALP PSORIASIS: ICD-10-CM

## 2019-05-06 DIAGNOSIS — R23.2 HOT FLASHES: ICD-10-CM

## 2019-05-08 RX ORDER — ESTRADIOL 0.04 MG/D
PATCH TRANSDERMAL
Qty: 12 PATCH | Refills: 1 | Status: SHIPPED | OUTPATIENT
Start: 2019-05-08

## 2019-06-03 ENCOUNTER — APPOINTMENT (RX ONLY)
Dept: URBAN - METROPOLITAN AREA CLINIC 22 | Facility: CLINIC | Age: 49
Setting detail: DERMATOLOGY
End: 2019-06-03

## 2019-06-03 DIAGNOSIS — L57.0 ACTINIC KERATOSIS: ICD-10-CM

## 2019-06-03 DIAGNOSIS — L81.4 OTHER MELANIN HYPERPIGMENTATION: ICD-10-CM

## 2019-06-03 DIAGNOSIS — L40.0 PSORIASIS VULGARIS: ICD-10-CM

## 2019-06-03 DIAGNOSIS — Z71.89 OTHER SPECIFIED COUNSELING: ICD-10-CM

## 2019-06-03 DIAGNOSIS — D22 MELANOCYTIC NEVI: ICD-10-CM

## 2019-06-03 DIAGNOSIS — D18.0 HEMANGIOMA: ICD-10-CM

## 2019-06-03 PROBLEM — D22.5 MELANOCYTIC NEVI OF TRUNK: Status: ACTIVE | Noted: 2019-06-03

## 2019-06-03 PROBLEM — D18.01 HEMANGIOMA OF SKIN AND SUBCUTANEOUS TISSUE: Status: ACTIVE | Noted: 2019-06-03

## 2019-06-03 PROBLEM — L30.9 DERMATITIS, UNSPECIFIED: Status: ACTIVE | Noted: 2019-06-03

## 2019-06-03 PROCEDURE — ? BIOPSY BY SHAVE METHOD

## 2019-06-03 PROCEDURE — ? COUNSELING

## 2019-06-03 PROCEDURE — 17003 DESTRUCT PREMALG LES 2-14: CPT

## 2019-06-03 PROCEDURE — 17000 DESTRUCT PREMALG LESION: CPT | Mod: 59

## 2019-06-03 PROCEDURE — 99203 OFFICE O/P NEW LOW 30 MIN: CPT | Mod: 25

## 2019-06-03 PROCEDURE — ? PRESCRIPTION

## 2019-06-03 PROCEDURE — 11102 TANGNTL BX SKIN SINGLE LES: CPT

## 2019-06-03 PROCEDURE — ? LIQUID NITROGEN

## 2019-06-03 RX ORDER — CALCIPOTRIENE AND BETAMETHASONE DIPROPIONATE 50; .5 UG/G; MG/G
1 AEROSOL, FOAM TOPICAL BID
Qty: 1 | Refills: 3 | Status: ERX | COMMUNITY
Start: 2019-06-03

## 2019-06-03 RX ADMIN — CALCIPOTRIENE AND BETAMETHASONE DIPROPIONATE 1: 50; .5 AEROSOL, FOAM TOPICAL at 22:49

## 2019-06-03 ASSESSMENT — LOCATION ZONE DERM
LOCATION ZONE: SCALP
LOCATION ZONE: TRUNK
LOCATION ZONE: FACE
LOCATION ZONE: EAR

## 2019-06-03 ASSESSMENT — LOCATION DETAILED DESCRIPTION DERM
LOCATION DETAILED: RIGHT LATERAL FOREHEAD
LOCATION DETAILED: LEFT SUPERIOR MEDIAL UPPER BACK
LOCATION DETAILED: EPIGASTRIC SKIN
LOCATION DETAILED: RIGHT SCAPHA
LOCATION DETAILED: RIGHT CENTRAL FRONTAL SCALP
LOCATION DETAILED: INFERIOR MID FOREHEAD

## 2019-06-03 ASSESSMENT — LOCATION SIMPLE DESCRIPTION DERM
LOCATION SIMPLE: RIGHT SCALP
LOCATION SIMPLE: RIGHT EAR
LOCATION SIMPLE: LEFT UPPER BACK
LOCATION SIMPLE: RIGHT FOREHEAD
LOCATION SIMPLE: ABDOMEN
LOCATION SIMPLE: INFERIOR FOREHEAD

## 2019-06-03 NOTE — PROCEDURE: BIOPSY BY SHAVE METHOD
Dressing: bandage
Post-Care Instructions: I reviewed with the patient in detail post-care instructions. Patient is to keep the biopsy site dry overnight, and then apply bacitracin twice daily until healed. Patient may apply hydrogen peroxide soaks to remove any crusting.
Was A Bandage Applied: Yes
Billing Type: Third-Party Bill
Type Of Destruction Used: Curettage
Electrodesiccation And Curettage Text: The wound bed was treated with electrodesiccation and curettage after the biopsy was performed.
Anesthesia Volume In Cc: 1
Notification Instructions: Patient will be notified of biopsy results. However, patient instructed to call the office if not contacted within 2 weeks.
Detail Level: Detailed
Biopsy Method: Personna blade
Bill For Surgical Tray: no
Curettage Text: The wound bed was treated with curettage after the biopsy was performed.
Silver Nitrate Text: The wound bed was treated with silver nitrate after the biopsy was performed.
Hemostasis: Drysol
Size Of Lesion In Cm: 0
Anesthesia Type: 1% lidocaine with 1:100,000 epinephrine and a 1:3 solution of 8.4% sodium bicarbonate
Cryotherapy Text: The wound bed was treated with cryotherapy after the biopsy was performed.
Wound Care: Vaseline
Lab Facility: 
Lab: [id number]
Consent: Written consent was obtained and risks were reviewed including but not limited to scarring, infection, bleeding, scabbing, incomplete removal, nerve damage and allergy to anesthesia.
Electrodesiccation Text: The wound bed was treated with electrodesiccation after the biopsy was performed.
Biopsy Type: H and E
Depth Of Biopsy: dermis

## 2019-06-05 ENCOUNTER — OFFICE VISIT (OUTPATIENT)
Dept: URGENT CARE | Facility: PHYSICIAN GROUP | Age: 49
End: 2019-06-05
Payer: COMMERCIAL

## 2019-06-05 VITALS
RESPIRATION RATE: 16 BRPM | DIASTOLIC BLOOD PRESSURE: 80 MMHG | TEMPERATURE: 98.9 F | OXYGEN SATURATION: 98 % | SYSTOLIC BLOOD PRESSURE: 112 MMHG | BODY MASS INDEX: 22.2 KG/M2 | WEIGHT: 121.4 LBS | HEART RATE: 76 BPM

## 2019-06-05 DIAGNOSIS — Z86.19 HISTORY OF CANDIDAL VULVOVAGINITIS: ICD-10-CM

## 2019-06-05 DIAGNOSIS — H65.05 RECURRENT ACUTE SEROUS OTITIS MEDIA OF LEFT EAR: ICD-10-CM

## 2019-06-05 PROCEDURE — 99214 OFFICE O/P EST MOD 30 MIN: CPT | Performed by: NURSE PRACTITIONER

## 2019-06-05 RX ORDER — FLUCONAZOLE 150 MG/1
150 TABLET ORAL DAILY
Qty: 1 TAB | Refills: 0 | Status: SHIPPED | OUTPATIENT
Start: 2019-06-05 | End: 2019-06-06

## 2019-06-05 RX ORDER — AMOXICILLIN AND CLAVULANATE POTASSIUM 875; 125 MG/1; MG/1
1 TABLET, FILM COATED ORAL 2 TIMES DAILY
Qty: 20 TAB | Refills: 0 | Status: SHIPPED | OUTPATIENT
Start: 2019-06-05 | End: 2019-06-15

## 2019-06-05 ASSESSMENT — ENCOUNTER SYMPTOMS
EYES NEGATIVE: 1
CARDIOVASCULAR NEGATIVE: 1
SHORTNESS OF BREATH: 0
PSYCHIATRIC NEGATIVE: 1
WHEEZING: 0
GASTROINTESTINAL NEGATIVE: 1
COUGH: 1
CHILLS: 0
FEVER: 0
SINUS PAIN: 0
MUSCULOSKELETAL NEGATIVE: 1
SORE THROAT: 1
NEUROLOGICAL NEGATIVE: 1

## 2019-06-05 NOTE — PATIENT INSTRUCTIONS
Otitis Media, Adult  Otitis media is redness, soreness, and inflammation of the middle ear. Otitis media may be caused by allergies or, most commonly, by infection. Often it occurs as a complication of the common cold.  What are the signs or symptoms?  Symptoms of otitis media may include:  · Earache.  · Fever.  · Ringing in your ear.  · Headache.  · Leakage of fluid from the ear.  How is this diagnosed?  To diagnose otitis media, your health care provider will examine your ear with an otoscope. This is an instrument that allows your health care provider to see into your ear in order to examine your eardrum. Your health care provider also will ask you questions about your symptoms.  How is this treated?  Typically, otitis media resolves on its own within 3-5 days. Your health care provider may prescribe medicine to ease your symptoms of pain. If otitis media does not resolve within 5 days or is recurrent, your health care provider may prescribe antibiotic medicines if he or she suspects that a bacterial infection is the cause.  Follow these instructions at home:  · If you were prescribed an antibiotic medicine, finish it all even if you start to feel better.  · Take medicines only as directed by your health care provider.  · Keep all follow-up visits as directed by your health care provider.  Contact a health care provider if:  · You have otitis media only in one ear, or bleeding from your nose, or both.  · You notice a lump on your neck.  · You are not getting better in 3-5 days.  · You feel worse instead of better.  Get help right away if:  · You have pain that is not controlled with medicine.  · You have swelling, redness, or pain around your ear or stiffness in your neck.  · You notice that part of your face is paralyzed.  · You notice that the bone behind your ear (mastoid) is tender when you touch it.  This information is not intended to replace advice given to you by your health care provider. Make sure you  discuss any questions you have with your health care provider.  Document Released: 09/22/2005 Document Revised: 05/25/2017 Document Reviewed: 07/15/2014  Elsevier Interactive Patient Education © 2017 [employer]

## 2019-06-05 NOTE — PROGRESS NOTES
Subjective:      Rosita Emmanuel is a 49 y.o. female who presents with Otalgia (left ear pain)            Left ear pain x 2 weeks. Told she had water behind the ear, and has reoccurring ear pain. OTC sudafed. Worried since she's going to be flying. Has seasonal allegries.      Otalgia    There is pain in the left ear. This is a recurrent problem. There has been no fever. The pain is at a severity of 7/10. Associated symptoms include coughing and a sore throat. Pertinent negatives include no ear discharge or hearing loss. Associated symptoms comments: No ear drainage. Dry cough. .       Review of Systems   Constitutional: Negative for chills and fever.   HENT: Positive for ear pain and sore throat. Negative for ear discharge, hearing loss and sinus pain.    Eyes: Negative.    Respiratory: Positive for cough. Negative for shortness of breath and wheezing.    Cardiovascular: Negative.    Gastrointestinal: Negative.    Genitourinary: Negative.    Musculoskeletal: Negative.    Skin: Negative.    Neurological: Negative.    Endo/Heme/Allergies: Positive for environmental allergies.   Psychiatric/Behavioral: Negative.           Objective:     /80 (BP Location: Left arm, Patient Position: Sitting, BP Cuff Size: Adult)   Pulse 76   Temp 37.2 °C (98.9 °F) (Temporal)   Resp 16   Wt 55.1 kg (121 lb 6.4 oz)   SpO2 98%   BMI 22.20 kg/m²      Physical Exam   Constitutional: She is oriented to person, place, and time. She appears well-developed and well-nourished. No distress.   HENT:   Head: Normocephalic and atraumatic.   Right Ear: External ear normal. No drainage, swelling or tenderness. No mastoid tenderness. Tympanic membrane is erythematous. Tympanic membrane is not perforated and not bulging. A middle ear effusion is present. No hemotympanum.   Left Ear: External ear normal. No drainage, swelling or tenderness. No mastoid tenderness. Tympanic membrane is erythematous and bulging. Tympanic membrane is not  perforated. A middle ear effusion is present. No hemotympanum.   Nose: Nose normal.   Mouth/Throat: Uvula is midline and mucous membranes are normal. Posterior oropharyngeal edema and posterior oropharyngeal erythema present. No oropharyngeal exudate or tonsillar abscesses. No tonsillar exudate.   Eyes: Pupils are equal, round, and reactive to light. Conjunctivae and EOM are normal. Right eye exhibits no discharge. Left eye exhibits no discharge.   Neck: Normal range of motion. Neck supple.   Cardiovascular: Normal rate.    Pulmonary/Chest: Effort normal and breath sounds normal.   Abdominal: Soft.   Musculoskeletal: Normal range of motion.   Lymphadenopathy:        Head (right side): Tonsillar adenopathy present. No submental, no submandibular, no preauricular, no posterior auricular and no occipital adenopathy present.        Head (left side): Tonsillar adenopathy present. No submental, no submandibular, no preauricular, no posterior auricular and no occipital adenopathy present.   Neurological: She is oriented to person, place, and time. She has normal strength.   Skin: Skin is warm and dry.   Psychiatric: She has a normal mood and affect. Her speech is normal and behavior is normal. Judgment and thought content normal.               Assessment/Plan:     1. Recurrent acute serous otitis media of left ear  - amoxicillin-clavulanate (AUGMENTIN) 875-125 MG Tab; Take 1 Tab by mouth 2 times a day for 10 days.  Dispense: 20 Tab; Refill: 0    2. History of candidal vulvovaginitis  - fluconazole (DIFLUCAN) 150 MG tablet; Take 1 Tab by mouth every day for 1 day.  Dispense: 1 Tab; Refill: 0    Take OTC Claritin and Flonase as directed.   Follow up with ENT.    Follow up for unresolved or increased ear pain, ear discharge, fevers, hearing changes, or any other concerns.

## 2022-12-04 NOTE — PATIENT COMMUNICATION
Cannot prescribe codeine cough syrup without being seen, sent tessalon perles to help with cough   
fever